# Patient Record
Sex: MALE | URBAN - METROPOLITAN AREA
[De-identification: names, ages, dates, MRNs, and addresses within clinical notes are randomized per-mention and may not be internally consistent; named-entity substitution may affect disease eponyms.]

---

## 2024-12-02 ENCOUNTER — APPOINTMENT (OUTPATIENT)
Dept: UROLOGY | Facility: CLINIC | Age: 34
End: 2024-12-02
Payer: COMMERCIAL

## 2024-12-02 VITALS
HEART RATE: 70 BPM | HEIGHT: 72 IN | SYSTOLIC BLOOD PRESSURE: 133 MMHG | WEIGHT: 210 LBS | BODY MASS INDEX: 28.44 KG/M2 | DIASTOLIC BLOOD PRESSURE: 91 MMHG

## 2024-12-02 DIAGNOSIS — Z30.09 ENCOUNTER FOR VASECTOMY ASSESSMENT: ICD-10-CM

## 2024-12-02 PROCEDURE — 1036F TOBACCO NON-USER: CPT | Performed by: UROLOGY

## 2024-12-02 PROCEDURE — 3008F BODY MASS INDEX DOCD: CPT | Performed by: UROLOGY

## 2024-12-02 PROCEDURE — 99203 OFFICE O/P NEW LOW 30 MIN: CPT | Performed by: UROLOGY

## 2024-12-02 RX ORDER — DIAZEPAM 10 MG/1
10 TABLET ORAL ONCE
Qty: 1 TABLET | Refills: 0 | Status: SHIPPED | OUTPATIENT
Start: 2024-12-02 | End: 2024-12-02

## 2024-12-02 ASSESSMENT — ENCOUNTER SYMPTOMS
ALLERGIC/IMMUNOLOGIC NEGATIVE: 1
COUGH: 0
DIFFICULTY URINATING: 0
PSYCHIATRIC NEGATIVE: 1
ENDOCRINE NEGATIVE: 1
EYES NEGATIVE: 1
FEVER: 0
SHORTNESS OF BREATH: 0
CHILLS: 0
NAUSEA: 0

## 2024-12-02 NOTE — PROGRESS NOTES
Subjective   Patient ID: Aleks Valdez is a 34 y.o. male.    HPI  Patient is here to establish for vasectomy consult. He is  with children. No LUT's sx.       Review of Systems   Constitutional:  Negative for chills and fever.   HENT: Negative.     Eyes: Negative.    Respiratory:  Negative for cough and shortness of breath.    Cardiovascular:  Negative for chest pain and leg swelling.   Gastrointestinal:  Negative for nausea.   Endocrine: Negative.    Genitourinary:  Negative for difficulty urinating.        Negative except for documented in HPI   Allergic/Immunologic: Negative.    Neurological:         Alert & oriented X 3   Hematological:         Denies blood thinners   Psychiatric/Behavioral: Negative.         Objective   Physical Exam  Vitals and nursing note reviewed.   Constitutional:       General: He is not in acute distress.     Appearance: Normal appearance.   Pulmonary:      Effort: Pulmonary effort is normal.   Abdominal:      Tenderness: There is no abdominal tenderness.   Genitourinary:     Comments: Kidneys non palpable bilaterally  Bladder non palpable or tender  Scrotum no mass, No hydrocele. Both Vas palpable  Epididymis- No spermatocele. Non Tender.  Testicles: No mass  Urethra: No discharge  Penis within normal limits... No lesions. circumcised  Prostate - deferred  Neurological:      Mental Status: He is alert.         Assessment/Plan       Diagnoses and all orders for this visit:  Encounter for vasectomy assessment      Pros/cons of vasectomy reviewed.   Questions answered. Valium Rx given.   Observe mild LUTS.   F/U vas in office

## 2024-12-24 NOTE — PROGRESS NOTES
Patient ID: Aleks Valdez is a 34 y.o. male.    Procedures  The patient was prepped and draped in the standard surgical fashion. 1% Lidocaine was injected into the scrotum. A small scrotal excision was made and the vas deferens brought through the incision. We then dissected the vas deferens free of its surroundings attachments and three clips were placed on the vas deferens. A section of the vas deferens was then excised. We then assured that adequate hemostasis was obtained. I closed the excision with a single chromic suture. The identical procedure was performed on the opposite side. The patient tolerated the procedure well and there were no complications. The patient was instructed on post-operative care as well as the importance of dropping off a semen analysis. The post-operative instructions were given to the patient in writing as well.       FOLLOW UP PRN

## 2025-01-09 ENCOUNTER — APPOINTMENT (OUTPATIENT)
Dept: UROLOGY | Facility: CLINIC | Age: 35
End: 2025-01-09
Payer: COMMERCIAL

## 2025-01-09 DIAGNOSIS — Z30.2 ADMISSION FOR VASECTOMY: ICD-10-CM

## 2025-01-09 PROCEDURE — 55250 REMOVAL OF SPERM DUCT(S): CPT | Performed by: UROLOGY

## 2025-01-09 RX ORDER — HYDROCODONE BITARTRATE AND ACETAMINOPHEN 5; 325 MG/1; MG/1
1 TABLET ORAL EVERY 6 HOURS PRN
Qty: 12 TABLET | Refills: 0 | Status: SHIPPED | OUTPATIENT
Start: 2025-01-09 | End: 2025-01-14

## 2025-01-09 ASSESSMENT — PATIENT HEALTH QUESTIONNAIRE - PHQ9
SUM OF ALL RESPONSES TO PHQ9 QUESTIONS 1 AND 2: 0
1. LITTLE INTEREST OR PLEASURE IN DOING THINGS: NOT AT ALL
2. FEELING DOWN, DEPRESSED OR HOPELESS: NOT AT ALL